# Patient Record
Sex: FEMALE | Race: WHITE | NOT HISPANIC OR LATINO | URBAN - METROPOLITAN AREA
[De-identification: names, ages, dates, MRNs, and addresses within clinical notes are randomized per-mention and may not be internally consistent; named-entity substitution may affect disease eponyms.]

---

## 2017-11-13 ENCOUNTER — OFFICE VISIT - HEALTHEAST (OUTPATIENT)
Dept: GERIATRICS | Facility: CLINIC | Age: 57
End: 2017-11-13

## 2017-11-13 ENCOUNTER — AMBULATORY - HEALTHEAST (OUTPATIENT)
Dept: ADMINISTRATIVE | Facility: CLINIC | Age: 57
End: 2017-11-13

## 2017-11-13 DIAGNOSIS — K21.9 GERD (GASTROESOPHAGEAL REFLUX DISEASE): ICD-10-CM

## 2017-11-13 DIAGNOSIS — F42.9 OCD (OBSESSIVE COMPULSIVE DISORDER): ICD-10-CM

## 2017-11-13 DIAGNOSIS — N17.9 AKI (ACUTE KIDNEY INJURY) (H): ICD-10-CM

## 2017-11-13 DIAGNOSIS — Z86.711 HISTORY OF PULMONARY EMBOLISM: ICD-10-CM

## 2017-11-13 DIAGNOSIS — S82.891A ANKLE FRACTURE, RIGHT: ICD-10-CM

## 2017-11-13 DIAGNOSIS — I10 HTN (HYPERTENSION): ICD-10-CM

## 2017-11-13 DIAGNOSIS — M48.061 LUMBAR STENOSIS: ICD-10-CM

## 2017-11-13 DIAGNOSIS — F41.9 ANXIETY: ICD-10-CM

## 2017-11-13 DIAGNOSIS — F32.A DEPRESSION: ICD-10-CM

## 2017-11-13 DIAGNOSIS — D72.829 LEUKOCYTOSIS: ICD-10-CM

## 2017-11-13 DIAGNOSIS — E11.9 TYPE 2 DIABETES MELLITUS (H): ICD-10-CM

## 2017-11-13 RX ORDER — OMEPRAZOLE 20 MG/1
20 TABLET, DELAYED RELEASE ORAL
Status: SHIPPED | COMMUNITY
Start: 2017-11-13 | End: 2023-01-01

## 2017-11-13 RX ORDER — CLONAZEPAM 0.5 MG/1
0.5 TABLET ORAL 2 TIMES DAILY PRN
Status: SHIPPED | COMMUNITY
Start: 2017-11-13 | End: 2023-01-01

## 2017-11-13 RX ORDER — POLYETHYLENE GLYCOL 3350 17 G/17G
17 POWDER, FOR SOLUTION ORAL DAILY PRN
Status: SHIPPED | COMMUNITY
Start: 2017-11-13

## 2017-11-13 RX ORDER — ATORVASTATIN CALCIUM 40 MG/1
40 TABLET, FILM COATED ORAL EVERY EVENING
Status: SHIPPED | COMMUNITY
Start: 2017-11-13

## 2017-11-13 RX ORDER — LISINOPRIL AND HYDROCHLOROTHIAZIDE 20; 25 MG/1; MG/1
1 TABLET ORAL DAILY
Status: SHIPPED | COMMUNITY
Start: 2017-11-13 | End: 2023-01-01

## 2017-11-13 RX ORDER — PREGABALIN 50 MG/1
50 CAPSULE ORAL 3 TIMES DAILY
Status: SHIPPED | COMMUNITY
Start: 2017-11-13 | End: 2023-01-01

## 2017-11-13 RX ORDER — OXYCODONE HYDROCHLORIDE 5 MG/1
5 TABLET ORAL EVERY 4 HOURS PRN
Status: SHIPPED | COMMUNITY
Start: 2017-11-13 | End: 2023-01-01

## 2017-11-13 RX ORDER — DULOXETIN HYDROCHLORIDE 30 MG/1
90 CAPSULE, DELAYED RELEASE ORAL DAILY
Status: SHIPPED | COMMUNITY
Start: 2017-11-13

## 2017-11-13 RX ORDER — ACETAMINOPHEN 500 MG
500 TABLET ORAL 3 TIMES DAILY
Status: SHIPPED | COMMUNITY
Start: 2017-11-13 | End: 2023-01-01

## 2017-11-13 RX ORDER — WARFARIN SODIUM 5 MG/1
5 TABLET ORAL SEE ADMIN INSTRUCTIONS
Status: SHIPPED | COMMUNITY
Start: 2017-11-13

## 2017-11-27 ENCOUNTER — OFFICE VISIT - HEALTHEAST (OUTPATIENT)
Dept: GERIATRICS | Facility: CLINIC | Age: 57
End: 2017-11-27

## 2017-11-27 ENCOUNTER — RECORDS - HEALTHEAST (OUTPATIENT)
Dept: ADMINISTRATIVE | Facility: OTHER | Age: 57
End: 2017-11-27

## 2017-11-27 DIAGNOSIS — E11.9 DM TYPE 2 (DIABETES MELLITUS, TYPE 2) (H): ICD-10-CM

## 2017-11-27 DIAGNOSIS — E66.9 OBESITY: ICD-10-CM

## 2017-11-29 ENCOUNTER — RECORDS - HEALTHEAST (OUTPATIENT)
Dept: ADMINISTRATIVE | Facility: OTHER | Age: 57
End: 2017-11-29

## 2017-11-30 ENCOUNTER — OFFICE VISIT - HEALTHEAST (OUTPATIENT)
Dept: GERIATRICS | Facility: CLINIC | Age: 57
End: 2017-11-30

## 2017-11-30 DIAGNOSIS — E11.9 DM TYPE 2 (DIABETES MELLITUS, TYPE 2) (H): ICD-10-CM

## 2017-12-04 ENCOUNTER — OFFICE VISIT - HEALTHEAST (OUTPATIENT)
Dept: GERIATRICS | Facility: CLINIC | Age: 57
End: 2017-12-04

## 2017-12-04 DIAGNOSIS — E11.9 DM TYPE 2 (DIABETES MELLITUS, TYPE 2) (H): ICD-10-CM

## 2017-12-06 RX ORDER — INSULIN GLARGINE 100 [IU]/ML
12 INJECTION, SOLUTION SUBCUTANEOUS AT BEDTIME
Status: SHIPPED | COMMUNITY
Start: 2017-12-06 | End: 2023-01-01 | Stop reason: ALTCHOICE

## 2017-12-07 ENCOUNTER — OFFICE VISIT - HEALTHEAST (OUTPATIENT)
Dept: GERIATRICS | Facility: CLINIC | Age: 57
End: 2017-12-07

## 2017-12-07 DIAGNOSIS — E66.9 OBESITY: ICD-10-CM

## 2017-12-07 DIAGNOSIS — E11.9 DM TYPE 2 (DIABETES MELLITUS, TYPE 2) (H): ICD-10-CM

## 2017-12-11 ENCOUNTER — OFFICE VISIT - HEALTHEAST (OUTPATIENT)
Dept: GERIATRICS | Facility: CLINIC | Age: 57
End: 2017-12-11

## 2017-12-11 DIAGNOSIS — E11.9 DM TYPE 2 (DIABETES MELLITUS, TYPE 2) (H): ICD-10-CM

## 2017-12-13 ENCOUNTER — OFFICE VISIT - HEALTHEAST (OUTPATIENT)
Dept: GERIATRICS | Facility: CLINIC | Age: 57
End: 2017-12-13

## 2017-12-13 DIAGNOSIS — R30.0 DYSURIA: ICD-10-CM

## 2017-12-14 ENCOUNTER — OFFICE VISIT - HEALTHEAST (OUTPATIENT)
Dept: GERIATRICS | Facility: CLINIC | Age: 57
End: 2017-12-14

## 2017-12-14 DIAGNOSIS — R30.0 DYSURIA: ICD-10-CM

## 2017-12-18 ENCOUNTER — OFFICE VISIT - HEALTHEAST (OUTPATIENT)
Dept: GERIATRICS | Facility: CLINIC | Age: 57
End: 2017-12-18

## 2017-12-18 DIAGNOSIS — F41.9 ANXIETY: ICD-10-CM

## 2017-12-22 ENCOUNTER — AMBULATORY - HEALTHEAST (OUTPATIENT)
Dept: GERIATRICS | Facility: CLINIC | Age: 57
End: 2017-12-22

## 2018-01-08 ENCOUNTER — RECORDS - HEALTHEAST (OUTPATIENT)
Dept: ADMINISTRATIVE | Facility: OTHER | Age: 58
End: 2018-01-08

## 2018-01-24 ENCOUNTER — RECORDS - HEALTHEAST (OUTPATIENT)
Dept: ADMINISTRATIVE | Facility: OTHER | Age: 58
End: 2018-01-24

## 2018-03-05 ENCOUNTER — RECORDS - HEALTHEAST (OUTPATIENT)
Dept: ADMINISTRATIVE | Facility: OTHER | Age: 58
End: 2018-03-05

## 2021-05-31 VITALS — WEIGHT: 293 LBS

## 2021-05-31 VITALS — WEIGHT: 293 LBS | WEIGHT: 293 LBS

## 2021-06-14 NOTE — PROGRESS NOTES
[facility]    Facility:   [facility] [[id number]]   Code Status: POLST AVAILABLE      CHIEF COMPLAINT/REASON FOR VISIT:  Chief Complaint   Patient presents with     Problem Visit     Hyperglycemia       HISTORY:      HPI: Lyndsay Rene is a 57 y.o. female with a past medical history of morbid obesity, chronic back pain due to lumbar stenosis, history of pulmonary embolism, depression, anxiety, OCD, type 2 DM, hypertension, and GERD who was admitted from 11/3/17 to 11/8/17 after a right ankle fracture secondary to a mechanical fall with hospital course complicated by AMS, JR, and leukocytosis.    Last week the the nursing staff asked for an evaluation of patient's medication regimen related to DMII. The patient has had blood glucose checks ranging from the upper 200's to 437. Each check has been well outside of expected value. She reports that she has been drinking a great deal of juice. She did not understand that it was not sugar free juice and has been having around 4 to 5 large 24 ounce glasses full per day. Additionally, she states she has not been eating a diabetic diet. She is snacking often. She denies any complications or concerns. She denies any other concerns including headaches, vision changes, chest pain/pressure, difficulty breathing, SOB, abdominal pain, nausea, vomiting, diarrhea, dysuria, increasing weakness.     Today she reports that she has quit snacking and has quit drinking juices but she continues to have elevated blood sugars. She states that she is feeling better and that she feels her blood sugars must be improving. She continues to denies any other concerns. When specifically asked about the side effects of Metformin, she reports some mild nausea, that she will work through.     Past Medical History:   Diagnosis Date     Abdominal pain 06/24/2017     Abdominal wall cellulitis 07/23/2014     Adverse effect of drug with proper dosing 08/27/2013      Anxiety      Atypical chest pain 06/24/2010     Back pain 02/26/2015     Bradycardia 12/19/2016     Cellulitis      Chest pain 11/20/17     Depression      DM type 2 (diabetes mellitus, type 2)      Dyslipidemia 01/06/2009     HTN (hypertension)      Hypokalemia      Hypokalemia 07/23/2012     Hypotension due to drugs 06/09/2017     Incarcerated umbilical hernia 02/25/2012     Leucocytosis      Leucocytosis 01/06/2013     Lumbar radiculopathy 01/06/2009     Lumbar stenosis      Lumbar stenosis 08/16/2014     Obesity      OCD (obsessive compulsive disorder)      Pneumonia 06/24/2017     Primary osteoarthritis of right knee 11/16/2016     Secondary DM without complications, uncontrolled 11/20/2007     Sepsis 04/27/2010     SIRS (systemic inflammatory response syndrome) 01/06/2013     Small bowel obstruction 06/11/2017     Stasis dermatitis 08/27/2013     Suicidal ideation 07/09/2015     Syncope      Tachycardia 01/06/2013     Umbilical hernia      UTI (urinary tract infection)      Vitamin D deficiency 03/06/2015             Family History   Problem Relation Age of Onset     Diabetes Mother      Diabetes Father      Social History     Social History     Marital status:      Spouse name: N/A     Number of children: N/A     Years of education: N/A     Social History Main Topics     Smoking status: Never Smoker     Smokeless tobacco: Never Used     Alcohol use No     Drug use: No     Sexual activity: Not Currently     Other Topics Concern     Not on file     Social History Narrative     No narrative on file       REVIEW OF SYSTEM:  Constitutional: negative  Eyes: negative  Ears, nose, mouth, throat, and face: negative  Respiratory: negative  Cardiovascular: negative  Gastrointestinal: negative  Genitourinary:negative  Integument/breast: negative  Hematologic/lymphatic: negative  Musculoskeletal:negative  Neurological: negative  Behavioral/Psych: negative  Endocrine: positive for elevated blood sugars    PHYSICAL  EXAM:   /70  Pulse (!) 105  Temp 98.4  F (36.9  C)  Resp 16  SpO2 95%  General appearance: alert, appears stated age and cooperative  Lungs: clear to auscultation bilaterally  Heart: regular rate and rhythm, S1, S2 normal, no murmur, click, rub or gallop  Abdomen: soft, non-tender; bowel sounds normal; no masses,  no organomegaly  Extremities: extremities normal, atraumatic, no cyanosis or edema, recent fracture of right ankle, casted.  Pulses: 2+ and symmetric  Skin: Skin color, texture, turgor normal. No rashes or lesions      LABS:   Hemoglobin A1c in one week    No results found for: HGBA1C      ASSESSMENT:      ICD-10-CM    1. DM type 2 (diabetes mellitus, type 2) E11.9      PLAN:    Uncontrolled DMII  -Continue current care plan from last visit, however add Lantus 10 units subcutaneously daily.   -Follow up in one week.     Total 25 minutes of which 75% was spent counseling and coordination of care of the above plan with patient, staff, and nursing staff.    Electronically signed by: Marie Oliveira CNP

## 2021-06-14 NOTE — PROGRESS NOTES
[facility]    Facility:   [facility] [[id number]]   Code Status: POLST AVAILABLE      CHIEF COMPLAINT/REASON FOR VISIT:  Chief Complaint   Patient presents with     Problem Visit     Hyperglycemia       HISTORY:      HPI: Lyndsay Rene is a 57 y.o. female with a past medical history of morbid obesity, chronic back pain due to lumbar stenosis, history of pulmonary embolism, depression, anxiety, OCD, type 2 DM, hypertension, and GERD who was admitted from 11/3/17 to 11/8/17 after a right ankle fracture secondary to a mechanical fall with hospital course complicated by AMS, JR, and leukocytosis. In recent history the the nursing staff asked for an evaluation of patient's medication regimen related to DMII. The patient has had blood glucose checks ranging from the upper 200's to 437. Each check has been well outside of expected value. She reports that she has been drinking a great deal of juice. She did not understand that it was not sugar free juice and has been having around 4 to 5 large 24 ounce glasses full per day. Additionally, she states she has not been eating a diabetic diet. She is snacking often. She denies any complications or concerns. She denies any other concerns including headaches, vision changes, chest pain/pressure, difficulty breathing, SOB, abdominal pain, nausea, vomiting, diarrhea, dysuria, increasing weakness.     In the past week she reports that she has quit snacking and has quit drinking juices but she continues to have elevated blood sugars. She states that she is feeling better and that she feels her blood sugars must be improving. When specifically asked about the side effects of Metformin, she reports some mild nausea, that she will work through.     Today nursing staff and patient relate that patient has continues to have significant hyperglycemia. Blood sugars have been consistently elevated, ranging in the 300's to the 400's. Patient has no other  problems she would like to discuss. She denies any other concerns including headaches, vision changes, chest pain/pressure, difficulty breathing, SOB, abdominal pain, nausea, vomiting, diarrhea, dysuria, increasing weakness.       Past Medical History:   Diagnosis Date     Abdominal pain 06/24/2017     Abdominal wall cellulitis 07/23/2014     Adverse effect of drug with proper dosing 08/27/2013     Anxiety      Atypical chest pain 06/24/2010     Back pain 02/26/2015     Bradycardia 12/19/2016     Cellulitis      Chest pain 11/20/17     Depression      DM type 2 (diabetes mellitus, type 2)      Dyslipidemia 01/06/2009     HTN (hypertension)      Hypokalemia      Hypokalemia 07/23/2012     Hypotension due to drugs 06/09/2017     Incarcerated umbilical hernia 02/25/2012     Leucocytosis      Leucocytosis 01/06/2013     Lumbar radiculopathy 01/06/2009     Lumbar stenosis      Lumbar stenosis 08/16/2014     Obesity      OCD (obsessive compulsive disorder)      Pneumonia 06/24/2017     Primary osteoarthritis of right knee 11/16/2016     Secondary DM without complications, uncontrolled 11/20/2007     Sepsis 04/27/2010     SIRS (systemic inflammatory response syndrome) 01/06/2013     Small bowel obstruction 06/11/2017     Stasis dermatitis 08/27/2013     Suicidal ideation 07/09/2015     Syncope      Tachycardia 01/06/2013     Umbilical hernia      UTI (urinary tract infection)      Vitamin D deficiency 03/06/2015             Family History   Problem Relation Age of Onset     Diabetes Mother      Diabetes Father      Social History     Social History     Marital status:      Spouse name: N/A     Number of children: N/A     Years of education: N/A     Social History Main Topics     Smoking status: Never Smoker     Smokeless tobacco: Never Used     Alcohol use No     Drug use: No     Sexual activity: Not Currently     Other Topics Concern     Not on file     Social History Narrative     No narrative on file       REVIEW  OF SYSTEM:  Constitutional: negative  Eyes: negative  Ears, nose, mouth, throat, and face: negative  Respiratory: negative  Cardiovascular: negative  Gastrointestinal: negative  Genitourinary:negative  Integument/breast: negative  Hematologic/lymphatic: negative  Musculoskeletal:negative  Neurological: negative  Behavioral/Psych: negative  Endocrine: positive for elevated blood sugars    PHYSICAL EXAM:   /72  Pulse 68  Temp 97.8  F (36.6  C)  Resp 16  Wt (!) 377 lb 9.6 oz (171.3 kg)  SpO2 95%  General appearance: alert, appears stated age and cooperative  Lungs: clear to auscultation bilaterally  Heart: regular rate and rhythm, S1, S2 normal, no murmur, click, rub or gallop  Abdomen: soft, non-tender; bowel sounds normal; no masses,  no organomegaly  Extremities: extremities normal, atraumatic, no cyanosis or edema, recent fracture of right ankle, casted.  Pulses: 2+ and symmetric  Skin: Skin color, texture, turgor normal. No rashes or lesions      LABS:   Will continue to monitor blood glucose, HGBA1C pending.    No results found for: HGBA1C      ASSESSMENT:      ICD-10-CM    1. DM type 2 (diabetes mellitus, type 2) E11.9      PLAN:    Uncontrolled DMII  -Continue current care plan from last visit, including addition of Lantus, however increase Lantus by 2 units every three days until blood sugars are less than 180, or you reach 20 units.   -Consult endocrinology for difficult to manage DMII.  -Follow up at the end of this week--consider adding sliding scale insulin at next visit.   -Encouraged patient to continue following ADA diet.     Total 25 minutes of which 75% was spent counseling and coordination of care of the above plan with patient, staff, and nursing staff.    Electronically signed by: Marie Oliveira CNP

## 2021-06-14 NOTE — PROGRESS NOTES
[facility]    Facility:   [facility] [[id number]]   Code Status: POLST AVAILABLE      CHIEF COMPLAINT/REASON FOR VISIT:  Chief Complaint   Patient presents with     Problem Visit     Hyperglycemia       HISTORY:      HPI: Lyndsay Rene is a 57 y.o. female with a past medical history of morbid obesity, chronic back pain due to lumbar stenosis, history of pulmonary embolism, depression, anxiety, OCD, type 2 DM, hypertension, and GERD who was admitted from 11/3/17 to 11/8/17 after a right ankle fracture secondary to a mechanical fall with hospital course complicated by AMS, JR, and leukocytosis. In recent history the the nursing staff asked for an evaluation of patient's medication regimen related to DMII. The patient has had blood glucose checks ranging from the upper 200's to 380's. Each check has been well outside of expected value. She denies any complications or concerns. In the past weeks she reports that she has quit snacking and has quit drinking juices but she continues to have elevated blood sugars. She states that she is feeling better and that she feels her blood sugars must be improving. Metformin is no longer problematic.    Today nursing staff and patient relate that patient has continues to have significant hyperglycemia. However it has started to improve with the addition of Metformin and titration of insulin. Blood sugars have been consistently elevated, ranging in the 200's to 300's They are now improving and mostly in the 200 range. They do feel she is improving. Patient has no other problems she would like to discuss. Her mood has much improved and she is now participating in PT/OT and encouraged by her progress. She feels she is well and is able to go home. She denies any other concerns including headaches, vision changes, chest pain/pressure, difficulty breathing, SOB, abdominal pain, nausea, vomiting, diarrhea, dysuria, increasing weakness.       Past  Medical History:   Diagnosis Date     Abdominal pain 06/24/2017     Abdominal wall cellulitis 07/23/2014     Adverse effect of drug with proper dosing 08/27/2013     Anxiety      Atypical chest pain 06/24/2010     Back pain 02/26/2015     Bradycardia 12/19/2016     Cellulitis      Chest pain 11/20/17     Depression      DM type 2 (diabetes mellitus, type 2)      Dyslipidemia 01/06/2009     HTN (hypertension)      Hypokalemia      Hypokalemia 07/23/2012     Hypotension due to drugs 06/09/2017     Incarcerated umbilical hernia 02/25/2012     Leucocytosis      Leucocytosis 01/06/2013     Lumbar radiculopathy 01/06/2009     Lumbar stenosis      Lumbar stenosis 08/16/2014     Obesity      OCD (obsessive compulsive disorder)      Pneumonia 06/24/2017     Primary osteoarthritis of right knee 11/16/2016     Secondary DM without complications, uncontrolled 11/20/2007     Sepsis 04/27/2010     SIRS (systemic inflammatory response syndrome) 01/06/2013     Small bowel obstruction 06/11/2017     Stasis dermatitis 08/27/2013     Suicidal ideation 07/09/2015     Syncope      Tachycardia 01/06/2013     Umbilical hernia      UTI (urinary tract infection)      Vitamin D deficiency 03/06/2015             Family History   Problem Relation Age of Onset     Diabetes Mother      Diabetes Father      Social History     Social History     Marital status:      Spouse name: N/A     Number of children: N/A     Years of education: N/A     Social History Main Topics     Smoking status: Never Smoker     Smokeless tobacco: Never Used     Alcohol use No     Drug use: No     Sexual activity: Not Currently     Other Topics Concern     Not on file     Social History Narrative     No narrative on file       REVIEW OF SYSTEM:  Constitutional: negative  Eyes: negative  Ears, nose, mouth, throat, and face: negative  Respiratory: negative  Cardiovascular: negative  Gastrointestinal: negative  Genitourinary:negative  Integument/breast:  negative  Hematologic/lymphatic: negative  Musculoskeletal:negative  Neurological: negative  Behavioral/Psych: negative  Endocrine: positive for elevated blood sugars    PHYSICAL EXAM:   /77  Pulse 86  Temp 97.4  F (36.3  C)  Resp 19  Wt (!) 379 lb 8 oz (172.1 kg)  SpO2 98%  General appearance: alert, appears stated age and cooperative  Lungs: clear to auscultation bilaterally  Heart: regular rate and rhythm, S1, S2 normal, no murmur, click, rub or gallop  Abdomen: soft, non-tender; bowel sounds normal; no masses,  no organomegaly  Extremities: extremities normal, atraumatic, no cyanosis or edema, recent fracture of right ankle, casted.  Pulses: 2+ and symmetric  Skin: Skin color, texture, turgor normal. No rashes or lesions      LABS:   Will continue to monitor blood glucose, HGBA1C pending.    No results found for: HGBA1C      ASSESSMENT:      ICD-10-CM    1. DM type 2 (diabetes mellitus, type 2) E11.9      PLAN:    Uncontrolled DMII  -Continue current care plan from last visit, including addition of Lantus. Lantus was still not being given. New order written to start Lantus.   -Consult endocrinology for difficult to manage DMII.  -Encouraged patient to continue following ADA diet.     Continue current services.     Total 25 minutes of which 75% was spent counseling and coordination of care of the above plan with patient, staff, and nursing staff.    Electronically signed by: Marie Oliveira CNP

## 2021-06-14 NOTE — PROGRESS NOTES
[facility]    Facility:   [facility] [[id number]]   Code Status: POLST AVAILABLE      CHIEF COMPLAINT/REASON FOR VISIT:  Chief Complaint   Patient presents with     Problem Visit     Hyperglycemia       HISTORY:      HPI: Lyndsay Rene is a 57 y.o. female with a past medical history of morbid obesity, chronic back pain due to lumbar stenosis, history of pulmonary embolism, depression, anxiety, OCD, type 2 DM, hypertension, and GERD who was admitted from 11/3/17 to 11/8/17 after a right ankle fracture secondary to a mechanical fall with hospital course complicated by AMS, JR, and leukocytosis. In recent history the the nursing staff asked for an evaluation of patient's medication regimen related to DMII. The patient has had blood glucose checks ranging from the upper 200's to 380's. Each check has been well outside of expected value. She denies any complications or concerns. In the past weeks she reports that she has quit snacking and has quit drinking juices but she continues to have elevated blood sugars. She states that she is feeling better and that she feels her blood sugars must be improving. Metformin is no longer problematic.    Today nursing staff and patient relate that patient has continues to have significant hyperglycemia. However it has started to improve with the addition of Metformin and titration of insulin. Blood sugars have been consistently elevated, ranging in the 200's to 380's. Patient has no other problems she would like to discuss. She does state that she would like to go home. She is getting quite agitated when discussing the ability to go home. She no longer wants to participate in PT/OT. She feels she is well and is able to go home. She denies any other concerns including headaches, vision changes, chest pain/pressure, difficulty breathing, SOB, abdominal pain, nausea, vomiting, diarrhea, dysuria, increasing weakness.       Past Medical History:    Diagnosis Date     Abdominal pain 06/24/2017     Abdominal wall cellulitis 07/23/2014     Adverse effect of drug with proper dosing 08/27/2013     Anxiety      Atypical chest pain 06/24/2010     Back pain 02/26/2015     Bradycardia 12/19/2016     Cellulitis      Chest pain 11/20/17     Depression      DM type 2 (diabetes mellitus, type 2)      Dyslipidemia 01/06/2009     HTN (hypertension)      Hypokalemia      Hypokalemia 07/23/2012     Hypotension due to drugs 06/09/2017     Incarcerated umbilical hernia 02/25/2012     Leucocytosis      Leucocytosis 01/06/2013     Lumbar radiculopathy 01/06/2009     Lumbar stenosis      Lumbar stenosis 08/16/2014     Obesity      OCD (obsessive compulsive disorder)      Pneumonia 06/24/2017     Primary osteoarthritis of right knee 11/16/2016     Secondary DM without complications, uncontrolled 11/20/2007     Sepsis 04/27/2010     SIRS (systemic inflammatory response syndrome) 01/06/2013     Small bowel obstruction 06/11/2017     Stasis dermatitis 08/27/2013     Suicidal ideation 07/09/2015     Syncope      Tachycardia 01/06/2013     Umbilical hernia      UTI (urinary tract infection)      Vitamin D deficiency 03/06/2015             Family History   Problem Relation Age of Onset     Diabetes Mother      Diabetes Father      Social History     Social History     Marital status:      Spouse name: N/A     Number of children: N/A     Years of education: N/A     Social History Main Topics     Smoking status: Never Smoker     Smokeless tobacco: Never Used     Alcohol use No     Drug use: No     Sexual activity: Not Currently     Other Topics Concern     Not on file     Social History Narrative     No narrative on file       REVIEW OF SYSTEM:  Constitutional: negative  Eyes: negative  Ears, nose, mouth, throat, and face: negative  Respiratory: negative  Cardiovascular: negative  Gastrointestinal: negative  Genitourinary:negative  Integument/breast:  "negative  Hematologic/lymphatic: negative  Musculoskeletal:negative  Neurological: negative  Behavioral/Psych: negative  Endocrine: positive for elevated blood sugars    PHYSICAL EXAM:   /77  Pulse 86  Temp 97.4  F (36.3  C)  Resp 18  Wt (!) 379 lb 8 oz (172.1 kg)  SpO2 98%  General appearance: alert, appears stated age and cooperative  Lungs: clear to auscultation bilaterally  Heart: regular rate and rhythm, S1, S2 normal, no murmur, click, rub or gallop  Abdomen: soft, non-tender; bowel sounds normal; no masses,  no organomegaly  Extremities: extremities normal, atraumatic, no cyanosis or edema, recent fracture of right ankle, casted.  Pulses: 2+ and symmetric  Skin: Skin color, texture, turgor normal. No rashes or lesions      LABS:   Will continue to monitor blood glucose, HGBA1C pending.    No results found for: HGBA1C      ASSESSMENT:      ICD-10-CM    1. Obesity E66.9    2. DM type 2 (diabetes mellitus, type 2) E11.9      PLAN:    Uncontrolled DMII  -Continue current care plan from last visit, including addition of Lantus. It was found that somebody discontinued Lantus inadvertently and without an order.   -Increased Lispro 75/25 to 70 units subcutaneously twice daily before meals. Patient was previously on 80 units twice daily and reports her blood sugars were \"never out of range\".  -Consult endocrinology for difficult to manage DMII.  -Follow up at the end of next week--consider adding sliding scale insulin at next visit.   -Encouraged patient to continue following ADA diet.     Plans for Discharge to Home  -Patient is not yet safe to go home, she indeed failed her home study.   -Continue PT/OT.   -Consult with social work to ensure needs are met at home with equipment, adaptive tools, and home services.    Total 25 minutes of which 75% was spent counseling and coordination of care of the above plan with patient, staff, and nursing staff.    Electronically signed by: Marie Oliveira CNP  "

## 2021-06-14 NOTE — PROGRESS NOTES
"Code Status:  FULL CODE  Visit Type: H & P     Facility:  [facility] [[id number]]      Facility Type: SNF (Skilled Nursing Facility, TCU)    History of Present Illness:   Hospital Admission Date: 11/3/17 Hospital Discharge Date: 11/8/17 at [facility]   Facility Admission Date: 11/8/17    Lyndsay Rene is a 57 y.o. female with a past medical history of morbid obesity, chronic back pain due to lumbar stenosis, history of pulmonary embolism, depression, anxiety, OCD, type 2 DM, hypertension, and GERD who was admitted from 11/3/17 to 11/8/17 after a right ankle fracture secondary to a mechanical fall with hospital course complicated by AMS, JR, and leukocytosis.    Prior to admission to the hospital, she was outside getting groceries when she slipped on snow on the grass and fell and heard a \"crack\". She was brought to [place] ED and was noted to have a transverse fracture of the lateral malleolus and she was admitted for pain control and strengthening. Additionally, she had been seen in the ED 18 times and admitted 3 times since June 2017 for multiple complaints including abdominal pain, weakness, and falls. During her hospital course, she developed a brief episode of altered mental status in which a rapid response was called on 11/6/17 that was thought to be secondary to her fentanyl patch so she was recommended to have scheduled tylenol, small amounts of oxycodone for severe breakthrough pain, and alternative non-narcotic modalities. Additionally, during the rapid response, she was noted to have a slightly elevated temperature and was given IVF and one dose of empiric antibiotic for possible sepsis. This was stopped after there was negative infection work up but she was noted to have a mild leukocytosis on 11/5/17. She also had a JR that resolved with fluid hydration. Her remaining home medications were continued without change except she was discontinued on hydroxyzine for its sedating effect. She " "was discharged to TCU for further strengthening and plans to follow up with Ortho in one week for repeat ankle x-ray.    Today, patient's main concern is her anxiety. Her mother and father passed away about 2 and 7 years ago respectively and she has been having a hard time with the loss because they were very close. She has been on cymbalta and clonazepam prn which she does not feel has been helping anymore because she often is crying and worried. She states that \"I want my mom and dad to help provide me comfort\". She denies any active suicidal ideation or attempts. This has made it hard for her to sleep. She says that the worry of being in a TCU and wondering how her ankle will heal is making her anxious so so has trouble falling asleep. She has been in therapy in the past but does not remember why she stopped but would be open again to it in the future. She lives with her  who has been providing her support.     In terms of her ankle pain, she has been having intermittent pain but it is tolerable with her pain medications. Prior to the injury, she was only using a cane and lives in a ground level apartment with her  with no steps. She does not have a home RN or PCA but her  is planning on having that set up once she discharges from the TCU. She does not drive and has no issues with bowel or bladder incontinence.    Additionally, patient and her  note that her warfarin was stopped by her PCP, Dr. Jonnie Arango, at [facility], about 2.5 weeks ago. She had an uncomplicated umbilical hernia repair on 6/20/17. However, she was later found to have a small emboli in the right lower lung lobe on 7/12/17 and was started on warfarin. Since the PE was thought to the provoked related to the surgery, patient and her  report she is supposed to be only on 3 months of anticoagulation therapy.         Past Medical History:   Diagnosis Date     Abdominal pain 06/24/2017     Abdominal wall " cellulitis 07/23/2014     Adverse effect of drug with proper dosing 08/27/2013     Anxiety      Atypical chest pain 06/24/2010     Back pain 02/26/2015     Bradycardia 12/19/2016     Cellulitis      Chest pain 11/20/17     Depression      DM type 2 (diabetes mellitus, type 2)      Dyslipidemia 01/06/2009     HTN (hypertension)      Hypokalemia      Hypokalemia 07/23/2012     Hypotension due to drugs 06/09/2017     Incarcerated umbilical hernia 02/25/2012     Leucocytosis      Leucocytosis 01/06/2013     Lumbar radiculopathy 01/06/2009     Lumbar stenosis      Lumbar stenosis 08/16/2014     Obesity      OCD (obsessive compulsive disorder)      Pneumonia 06/24/2017     Primary osteoarthritis of right knee 11/16/2016     Secondary DM without complications, uncontrolled 11/20/2007     Sepsis 04/27/2010     SIRS (systemic inflammatory response syndrome) 01/06/2013     Small bowel obstruction 06/11/2017     Stasis dermatitis 08/27/2013     Suicidal ideation 07/09/2015     Syncope      Tachycardia 01/06/2013     Umbilical hernia      UTI (urinary tract infection)      Vitamin D deficiency 03/06/2015     Past Surgical History:   Procedure Laterality Date     APPENDECTOMY       HERNIA REPAIR  06/20/2017     HYSTERECTOMY       WA REMOVE GALLBLADDER EXPLOR COMMON DUCT       Family History   Problem Relation Age of Onset     Diabetes Mother      Diabetes Father      Social History     Social History     Marital status:      Spouse name: N/A     Number of children: N/A     Years of education: N/A     Occupational History     Not on file.     Social History Main Topics     Smoking status: Never Smoker     Smokeless tobacco: Never Used     Alcohol use No     Drug use: No     Sexual activity: Not Currently     Other Topics Concern     Not on file     Social History Narrative     No narrative on file     Additional Geriatric Review: Lives at home with  in ground level apartment. Usually uses a cane to walk. Does not  drive. No bowel or bladder issues. No hearing or vision concerns.    Current Outpatient Prescriptions   Medication Sig Dispense Refill     acetaminophen (TYLENOL) 500 MG tablet Take 500 mg by mouth 3 (three) times a day.       atorvastatin (LIPITOR) 40 MG tablet Take 40 mg by mouth every evening.       clonazePAM (KLONOPIN) 0.5 MG tablet Take 0.5 mg by mouth 2 (two) times a day as needed for anxiety.       DULoxetine (CYMBALTA) 30 MG capsule Take 60 mg by mouth daily.       insulin lispro protamine-insulin lispro (HUMALOG 75-25) 100 unit/mL (75-25) Susp Inject 65 Units under the skin 2 (two) times a day before meals.       lisinopril-hydrochlorothiazide (PRINZIDE,ZESTORETIC) 20-25 mg per tablet Take 1 tablet by mouth daily.       omeprazole (PRILOSEC OTC) 20 MG tablet Take 20 mg by mouth daily before breakfast.       oxyCODONE (ROXICODONE) 5 MG immediate release tablet Take 5 mg by mouth every 4 (four) hours as needed for pain.       polyethylene glycol (MIRALAX) 17 gram packet Take 17 g by mouth daily.       pregabalin (LYRICA) 50 MG capsule Take 50 mg by mouth 3 (three) times a day. 1 tab po am;1 tab po afternoon;2 tabs at hs.       warfarin (COUMADIN) 5 MG tablet Take 5 mg by mouth See Admin Instructions. Adjust dose based on INR results as directed. Next INR 1 day after discharge       No current facility-administered medications for this visit.      No Known Allergies    There is no immunization history on file for this patient.      Review of Systems   Constitutional: Negative for activity change, appetite change, fatigue and fever.   HENT: Negative for congestion, ear discharge, ear pain, hearing loss and sore throat.    Eyes: Negative for pain, redness and visual disturbance.   Respiratory: Negative for cough, chest tightness and shortness of breath.    Gastrointestinal: Negative for abdominal pain, constipation, diarrhea and nausea.   Endocrine: Negative for polydipsia, polyphagia and polyuria.    Genitourinary: Negative for flank pain.   Musculoskeletal:        Right ankle pain with movement. No paresthesias.    Skin: Negative for rash and wound.   Neurological: Negative for dizziness, tremors, syncope, weakness, light-headedness and headaches.   Psychiatric/Behavioral: Positive for sleep disturbance. Negative for hallucinations and suicidal ideas. The patient is nervous/anxious.         Anxious mood. Trouble sleeping. No SI or HI.        Physical Exam   Constitutional: She is oriented to person, place, and time. She appears well-developed and well-nourished.   Tearful middle aged female with obesity. Seated in wheelchair with her . In emotional distress but no physical distress.   HENT:   Head: Normocephalic and atraumatic.   Eyes: Conjunctivae are normal. Pupils are equal, round, and reactive to light.   Neck: Neck supple.   Cardiovascular: Normal rate, regular rhythm and normal heart sounds.    Pulmonary/Chest: Effort normal and breath sounds normal. No respiratory distress.   Abdominal: Soft. Bowel sounds are normal. There is no tenderness. There is no rebound and no guarding.   Large pannus   Musculoskeletal:   Right ankle wrapped in ace wrap. Able to wiggle toes with normal capillary refill. Notes tenderness with palpation of lateral malleolus. Left ankle without deformity.   Neurological: She is alert and oriented to person, place, and time. No cranial nerve deficit.   Sensation and strength intact throughout.    Skin: Skin is warm and dry. No rash noted. No erythema.   Psychiatric:   Anxious mood, anxious affect. Fair insight, judgement, and memory.     Labs:  All labs reviewed in the nursing home record.    Assessment/Plan:  1. Ankle fracture, right  Right transverse fracture of lateral malleolus sustained on 11/3/17 after mechanical fall currently stable.Will continue scheduled tylenol  and prn oxycodone 5 mg Q4H prn for only severe pain. She will also continue therapies and will be non  weight bearing on right leg until re-evaluated by Ortho. Plan for follow up with [facility] and repeat ankle x-ray tomorrow, 11/14/17, to decide if cast needed.    2. Anxiety  Noted to have significant anxiety and difficulty sleeping. Will continue her on cymbalta 60 mg daily. Given that she is still having significant anxiety and sleep issues, will scheduled her 0.5 mg clonazepam BID and additional 0.5 mg clonazepam BID prn for breakthrough anxiety. Will also start mirtazapine 7.5 mg QHS for sleep and anxiety.   3. Depression  Contracts for safety and will continue management as above for her anxiety and depression.   4. OCD (obsessive compulsive disorder)  Will continue to provide reassurance to patient.   5. Type 2 diabetes mellitus  Blood sugars have been controlled. Will continue her Humalog 65 units BID before meals, POCT glucose checks 4x/day, and statin.    6. HTN (hypertension)  Normotensive. Will continue her lisinopril-HCTZ 20-25 mg daily.    7. GERD (gastroesophageal reflux disease)  Stable. Will continue omeprazole 20 mg daily.   8. Lumbar stenosis  Followed by [facility]. Will continue her Lyrica 50 mg QAM and 100 mg QHS. Will try to limit narcotics due to AMS she had while inpatient.   9. Leukocytosis  Noted to be 15 on 11/5/17 while at [facility]. No infectious symptoms and afebrile currently, but will recheck CBC to make sure resolved.    10. JR (acute kidney injury)  Has elevated creatinine that improved with IVF. Creatinine was 0.82 on 11/7/17. Will check BMP to make sure creatinine remains stable.    11. History of pulmonary embolism  History of small right lower lung lobe emboli suspected to be provoked secondary to surgery and seen on CT on 7/12/17. She has completed 3 months of anticoagulation with warfarin and asymptomatic. Will discontinue her warfarin as she has had adequate treatment course.        Jeniffer Strong MD PGY-3  [facility]     Patient  discussed with Dr. Pankaj Ramriez, attending physician, who agrees with the plan.  Patient was discussed examined and chart was reviewed with third-year family practice resident Dr. Carri Graham and I agree with her assessment and plan. Pankaj Ramirez MD        Total 45 minutes of which 65% was spent in counseling and coordination of care of the above plan.    Electronically signed by: Pankaj Ramirez MD

## 2021-06-14 NOTE — PROGRESS NOTES
"[facility]    Facility:   [facility] [[id number]]   Code Status: POLST AVAILABLE      CHIEF COMPLAINT/REASON FOR VISIT:  Chief Complaint   Patient presents with     Problem Visit     DMII with uncontrolled blood sugars       HISTORY:      HPI: Lyndsay Rene is a 57 y.o. female with a past medical history of morbid obesity, chronic back pain due to lumbar stenosis, history of pulmonary embolism, depression, anxiety, OCD, type 2 DM, hypertension, and GERD who was admitted from 11/3/17 to 11/8/17 after a right ankle fracture secondary to a mechanical fall with hospital course complicated by AMS, JR, and leukocytosis.    Prior to admission to the hospital, she was outside getting groceries when she slipped on snow on the grass and fell and heard a \"crack\". She was brought to [facility] and was noted to have a transverse fracture of the lateral malleolus and she was admitted for pain control and strengthening. Additionally, she had been seen in the ED 18 times and admitted 3 times since June 2017 for multiple complaints including abdominal pain, weakness, and falls. During her hospital course, she developed a brief episode of altered mental status in which a rapid response was called on 11/6/17 that was thought to be secondary to her fentanyl patch so she was recommended to have scheduled tylenol, small amounts of oxycodone for severe breakthrough pain, and alternative non-narcotic modalities. Additionally, during the rapid response, she was noted to have a slightly elevated temperature and was given IVF and one dose of empiric antibiotic for possible sepsis. This was stopped after there was negative infection work up but she was noted to have a mild leukocytosis on 11/5/17. She also had a JR that resolved with fluid hydration. Her remaining home medications were continued without change except she was discontinued on hydroxyzine for its sedating effect. She was discharged to TCU " "for further strengthening and plans to follow up with Ortho in one week for repeat ankle x-ray.    Upon admission the patient's main concern was her anxiety. Her mother and father passed away about 2 and 7 years ago respectively and she has been having a hard time with the loss because they were very close. She has been on cymbalta and clonazepam prn which she does not feel has been helping anymore because she often is crying and worried. She states that \"I want my mom and dad to help provide me comfort\". She denies any active suicidal ideation or attempts. This has made it hard for her to sleep. She says that the worry of being in a TCU and wondering how her ankle will heal is making her anxious so so has trouble falling asleep. She has been in therapy in the past but does not remember why she stopped but would be open again to it in the future. She lives with her  who has been providing her support.     In terms of her ankle pain, she has been having intermittent pain but it is tolerable with her pain medications. Prior to the injury, she was only using a cane and lives in a ground level apartment with her  with no steps. She does not have a home RN or PCA but her  is planning on having that set up once she discharges from the TCU. She does not drive and has no issues with bowel or bladder incontinence.    Additionally, patient and her  note that her warfarin was stopped by her PCP, Dr. Jonnie Arango, at \[facility]", about 2.5 weeks ago. She had an uncomplicated umbilical hernia repair on 6/20/17. However, she was later found to have a small emboli in the right lower lung lobe on 7/12/17 and was started on warfarin. Since the PE was thought to the provoked related to the surgery, patient and her  report she is supposed to be only on 3 months of anticoagulation therapy.     Today the the nursing staff asked for an evaluation of patient's medication regimen related to DMII. The " patient has had blood glucose checks ranging from the upper 200's to 437. Each check has been well outside of expected value. She reports that she has been drinking a great deal of juice. She did not understand that it was not sugar free juice and has been having around 4 to 5 large 24 ounce glasses full per day. Additionally, she states she has not been eating a diabetic diet. She is snacking often. She denies any complications or concerns. She denies any other concerns including headaches, vision changes, chest pain/pressure, difficulty breathing, SOB, abdominal pain, nausea, vomiting, diarrhea, dysuria, increasing weakness.     Patient is not on Metformin and states she has never been on it. She states nobody has ever suggested adding that to her regimen. She would like to try it.      Past Medical History:   Diagnosis Date     Abdominal pain 06/24/2017     Abdominal wall cellulitis 07/23/2014     Adverse effect of drug with proper dosing 08/27/2013     Anxiety      Atypical chest pain 06/24/2010     Back pain 02/26/2015     Bradycardia 12/19/2016     Cellulitis      Chest pain 11/20/17     Depression      DM type 2 (diabetes mellitus, type 2)      Dyslipidemia 01/06/2009     HTN (hypertension)      Hypokalemia      Hypokalemia 07/23/2012     Hypotension due to drugs 06/09/2017     Incarcerated umbilical hernia 02/25/2012     Leucocytosis      Leucocytosis 01/06/2013     Lumbar radiculopathy 01/06/2009     Lumbar stenosis      Lumbar stenosis 08/16/2014     Obesity      OCD (obsessive compulsive disorder)      Pneumonia 06/24/2017     Primary osteoarthritis of right knee 11/16/2016     Secondary DM without complications, uncontrolled 11/20/2007     Sepsis 04/27/2010     SIRS (systemic inflammatory response syndrome) 01/06/2013     Small bowel obstruction 06/11/2017     Stasis dermatitis 08/27/2013     Suicidal ideation 07/09/2015     Syncope      Tachycardia 01/06/2013     Umbilical hernia      UTI (urinary tract  infection)      Vitamin D deficiency 03/06/2015             Family History   Problem Relation Age of Onset     Diabetes Mother      Diabetes Father      Social History     Social History     Marital status:      Spouse name: N/A     Number of children: N/A     Years of education: N/A     Social History Main Topics     Smoking status: Never Smoker     Smokeless tobacco: Never Used     Alcohol use No     Drug use: No     Sexual activity: Not Currently     Other Topics Concern     Not on file     Social History Narrative     No narrative on file       REVIEW OF SYSTEM:  Constitutional: negative  Eyes: negative  Ears, nose, mouth, throat, and face: negative  Respiratory: negative  Cardiovascular: negative  Gastrointestinal: negative  Genitourinary:negative  Integument/breast: negative  Hematologic/lymphatic: negative  Musculoskeletal:negative  Neurological: negative  Behavioral/Psych: negative  Endocrine: positive for elevated blood sugars    PHYSICAL EXAM:   /63  Pulse 65  Temp (!) 96.3  F (35.7  C)  Resp 18  Wt (!) 377 lb 9.6 oz (171.3 kg)  SpO2 96%  General appearance: alert, appears stated age and cooperative  Lungs: clear to auscultation bilaterally  Heart: regular rate and rhythm, S1, S2 normal, no murmur, click, rub or gallop  Abdomen: soft, non-tender; bowel sounds normal; no masses,  no organomegaly  Extremities: extremities normal, atraumatic, no cyanosis or edema, recent fracture of right ankle, casted.  Pulses: 2+ and symmetric  Skin: Skin color, texture, turgor normal. No rashes or lesions      LABS:   Hemoglobin A1c in one week    No results found for: HGBA1C      ASSESSMENT:      ICD-10-CM    1. Obesity E66.9    2. DM type 2 (diabetes mellitus, type 2) E11.9      PLAN:    Uncontrolled DMII  -Start Metformin, will titrate slowly to try and mitigate side effects. Started at 500 mg by mouth once daily for one week, then increased to 500 mg by mouth twice daily for one week and then finally  "1000 mg by mouth twice daily.  -Will check Hemoglobin A1c in one week to get a baseline. Pt is unaware of her last hemoglobin A1c but thinks it was relatively \"good\".   -No juice! Spent a great deal of time discussing sugars/carbs and cutting down on foods that would increase blood sugar.   -Dietician referral to discuss diabetic diet.   -Follow up on Thursday, will consider adding Lantus at that time.     Total 35 minutes of which 75% was spent counseling and coordination of care of the above plan with patient, staff, and nursing staff.    Electronically signed by: Marie Oliveira CNP  "

## 2021-06-14 NOTE — PROGRESS NOTES
[facility]    Facility:   [facility] [[id number]]   Code Status: POLST AVAILABLE      CHIEF COMPLAINT/REASON FOR VISIT:  Chief Complaint   Patient presents with     Review Of Multiple Medical Conditions     DMII, Hyperglycemia     Problem Visit     Dysuria       HISTORY:      HPI: Lyndsay Rene is a 57 y.o. female with a past medical history of morbid obesity, chronic back pain due to lumbar stenosis, history of pulmonary embolism, depression, anxiety, OCD, type 2 DM, hypertension, and GERD who was admitted from 11/3/17 to 11/8/17 after a right ankle fracture secondary to a mechanical fall with hospital course complicated by AMS, JR, and leukocytosis. In recent history the the nursing staff asked for an evaluation of patient's medication regimen related to DMII. The patient has had blood glucose checks ranging from the upper 200's. Each check has been well outside of expected value. She denies any complications or concerns. In the past weeks she reports that she has quit snacking and has quit drinking juices but she continues to have elevated blood sugars. She states that she is feeling better and that she feels her blood sugars must be improving. Metformin is no longer problematic.    Today nursing staff and patient relate that patient has continues to have significant hyperglycemia. However it has started to improve with the addition of Metformin and titration of insulin. They are now improving and mostly in the 200 range. They do feel she is improving. Patient has no other problems she would like to discuss. Her mood has much improved and she is now participating in PT/OT and encouraged by her progress. She does have some dysuria and has a history of UTIs. She would like to have her urine checked. She states she has urgency, frequency, and foul smelling urine. She feels she is well and is able to go home. She denies any other concerns including headaches, vision changes,  chest pain/pressure, difficulty breathing, SOB, abdominal pain, nausea, vomiting, diarrhea, increasing weakness.     Past Medical History:   Diagnosis Date     Abdominal pain 06/24/2017     Abdominal wall cellulitis 07/23/2014     Adverse effect of drug with proper dosing 08/27/2013     Anxiety      Atypical chest pain 06/24/2010     Back pain 02/26/2015     Bradycardia 12/19/2016     Cellulitis      Chest pain 11/20/17     Depression      DM type 2 (diabetes mellitus, type 2)      Dyslipidemia 01/06/2009     HTN (hypertension)      Hypokalemia      Hypokalemia 07/23/2012     Hypotension due to drugs 06/09/2017     Incarcerated umbilical hernia 02/25/2012     Leucocytosis      Leucocytosis 01/06/2013     Lumbar radiculopathy 01/06/2009     Lumbar stenosis      Lumbar stenosis 08/16/2014     Obesity      OCD (obsessive compulsive disorder)      Pneumonia 06/24/2017     Primary osteoarthritis of right knee 11/16/2016     Secondary DM without complications, uncontrolled 11/20/2007     Sepsis 04/27/2010     SIRS (systemic inflammatory response syndrome) 01/06/2013     Small bowel obstruction 06/11/2017     Stasis dermatitis 08/27/2013     Suicidal ideation 07/09/2015     Syncope      Tachycardia 01/06/2013     Umbilical hernia      UTI (urinary tract infection)      Vitamin D deficiency 03/06/2015             Family History   Problem Relation Age of Onset     Diabetes Mother      Diabetes Father      Social History     Social History     Marital status:      Spouse name: N/A     Number of children: N/A     Years of education: N/A     Social History Main Topics     Smoking status: Never Smoker     Smokeless tobacco: Never Used     Alcohol use No     Drug use: No     Sexual activity: Not Currently     Other Topics Concern     Not on file     Social History Narrative     No narrative on file       REVIEW OF SYSTEM:  Constitutional: negative  Eyes: negative  Ears, nose, mouth, throat, and face: negative  Respiratory:  negative  Cardiovascular: negative  Gastrointestinal: negative  Genitourinary: dysuria, frequency, urgency, foul smelling urine  Integument/breast: negative  Hematologic/lymphatic: negative  Musculoskeletal:negative  Neurological: negative  Behavioral/Psych: negative  Endocrine: positive for elevated blood sugars    PHYSICAL EXAM:   /77  Pulse 97  Temp 97.4  F (36.3  C)  Resp 20  Wt (!) 380 lb (172.4 kg)  General appearance: alert, appears stated age and cooperative  Lungs: clear to auscultation bilaterally  Heart: regular rate and rhythm, S1, S2 normal, no murmur, click, rub or gallop  Abdomen: soft, non-tender; bowel sounds normal; no masses,  no organomegaly  Extremities: extremities normal, atraumatic, no cyanosis or edema, recent fracture of right ankle, casted.  Pulses: 2+ and symmetric  Skin: Skin color, texture, turgor normal. No rashes or lesions      LABS:   Will continue to monitor blood glucose, HGBA1C pending.    No results found for: HGBA1C      ASSESSMENT:      ICD-10-CM    1. Dysuria R30.0      PLAN:    Uncontrolled DMII  -Blood sugars have been much better.   -Increase Lispro 75/25 to 80 units subcutaneously twice daily before meals.   -Continue Lantus.  -Consult endocrinology for difficult to manage DMII.  -Encouraged patient to continue following ADA diet.     Dysuria  -UA/UC order.   -Follow up with results.     Continue current services.     Total 25 minutes of which 75% was spent counseling and coordination of care of the above plan with patient, staff, and nursing staff.    Electronically signed by: Marie Oliveira CNP

## 2021-06-15 NOTE — PROGRESS NOTES
"[facility]    Facility:   [facility] [[id number]]   Code Status: POLST AVAILABLE      CHIEF COMPLAINT/REASON FOR VISIT:  Chief Complaint   Patient presents with     Problem Visit     Anxiety       HISTORY:      HPI: Lyndsay Rene is a 57 y.o. female with a past medical history of morbid obesity, chronic back pain due to lumbar stenosis, history of pulmonary embolism, depression, anxiety, OCD, type 2 DM, hypertension, and GERD who was admitted from 11/3/17 to 11/8/17 after a right ankle fracture secondary to a mechanical fall with hospital course complicated by AMS, JR, and leukocytosis.     Today nursing staff report that patient was sent to the hospital for anxiety on Sunday night. Apparently she had an episode of anxiety that the patient reports as \"not that bad\". She was just out of her medicine and nobody had phoned for any per her report. She said she had some hyperventilation and the nursing staff then thought she should be sent in. Since going to the ER and returning she states she has been 100 % fine. She has no medical complaints to discuss at this time. She denies any other concerns including headaches, vision changes, chest pain/pressure, difficulty breathing, SOB, abdominal pain, nausea, vomiting, diarrhea, increasing weakness.     Past Medical History:   Diagnosis Date     Abdominal pain 06/24/2017     Abdominal wall cellulitis 07/23/2014     Adverse effect of drug with proper dosing 08/27/2013     Anxiety      Atypical chest pain 06/24/2010     Back pain 02/26/2015     Bradycardia 12/19/2016     Cellulitis      Chest pain 11/20/17     Depression      DM type 2 (diabetes mellitus, type 2)      Dyslipidemia 01/06/2009     HTN (hypertension)      Hypokalemia      Hypokalemia 07/23/2012     Hypotension due to drugs 06/09/2017     Incarcerated umbilical hernia 02/25/2012     Leucocytosis      Leucocytosis 01/06/2013     Lumbar radiculopathy 01/06/2009     Lumbar " stenosis      Lumbar stenosis 08/16/2014     Obesity      OCD (obsessive compulsive disorder)      Pneumonia 06/24/2017     Primary osteoarthritis of right knee 11/16/2016     Secondary DM without complications, uncontrolled 11/20/2007     Sepsis 04/27/2010     SIRS (systemic inflammatory response syndrome) 01/06/2013     Small bowel obstruction 06/11/2017     Stasis dermatitis 08/27/2013     Suicidal ideation 07/09/2015     Syncope      Tachycardia 01/06/2013     Umbilical hernia      UTI (urinary tract infection)      Vitamin D deficiency 03/06/2015             Family History   Problem Relation Age of Onset     Diabetes Mother      Diabetes Father      Social History     Social History     Marital status:      Spouse name: N/A     Number of children: N/A     Years of education: N/A     Social History Main Topics     Smoking status: Never Smoker     Smokeless tobacco: Never Used     Alcohol use No     Drug use: No     Sexual activity: Not Currently     Other Topics Concern     Not on file     Social History Narrative     No narrative on file       REVIEW OF SYSTEM:  Constitutional: negative  Eyes: negative  Ears, nose, mouth, throat, and face: negative  Respiratory: negative  Cardiovascular: negative  Gastrointestinal: negative  Genitourinary: dysuria, frequency, urgency, foul smelling urine  Integument/breast: negative  Hematologic/lymphatic: negative  Musculoskeletal:negative  Neurological: negative  Behavioral/Psych: history of anxiety, none now.  Endocrine: positive for elevated blood sugars    PHYSICAL EXAM:   /77  Pulse 78  Temp 98.2  F (36.8  C)  Resp 18  Wt (!) 380 lb (172.4 kg)  SpO2 97%  General appearance: alert, appears stated age and cooperative  Lungs: clear to auscultation bilaterally  Heart: regular rate and rhythm, S1, S2 normal, no murmur, click, rub or gallop  Abdomen: soft, non-tender; bowel sounds normal; no masses,  no organomegaly  Extremities: extremities normal,  atraumatic, no cyanosis or edema, recent fracture of right ankle in a boot  Pulses: 2+ and symmetric  Skin: Skin color, texture, turgor normal. No rashes or lesions      LABS:   None today.    No results found for: HGBA1C      ASSESSMENT:      ICD-10-CM    1. Anxiety F41.9      PLAN:    Anxiety  -Thoroughly discussed anxiety, treatment, and plan of care with patient.   -Ensured patient had prescription medications: Clonazepam 0.5 mg by mouth twice daily. Orders left with nursing staff.   -Discussed discharge at length, which apparently put patient at ease.  -Discussed nonpharmalogic methods of stress and anxiety reduction such as deep breathing, essential oil use, exercise, and talk therapy.  -Follow up as needed.     Total 25 minutes of which 75% was spent counseling and coordination of care of the above plan with patient, staff, and nursing staff.    Electronically signed by: Marie Oliveira CNP

## 2021-06-15 NOTE — PROGRESS NOTES
"[facility]    Facility:   [facility] [[id number]]   Code Status: POLST AVAILABLE      CHIEF COMPLAINT/REASON FOR VISIT:  Chief Complaint   Patient presents with     Problem Visit     Dysuria       HISTORY:      HPI: Lyndsay Rene is a 57 y.o. female with a past medical history of morbid obesity, chronic back pain due to lumbar stenosis, history of pulmonary embolism, depression, anxiety, OCD, type 2 DM, hypertension, and GERD who was admitted from 11/3/17 to 11/8/17 after a right ankle fracture secondary to a mechanical fall with hospital course complicated by AMS, JR, and leukocytosis.     Today nursing staff reports they were unable to get a urine specimen in the past 24 hours. The patient is requesting/demanding to see me because the dysuria continues and \"nobody is taking care of her\". She states, \"I know I have a UTI!\". She gets them frequently. Orders were put in yesterday, however there was miscommunication and that lead to the UA not being collected. She denies any other concerns including headaches, vision changes, chest pain/pressure, difficulty breathing, SOB, abdominal pain, nausea, vomiting, diarrhea, increasing weakness.     Past Medical History:   Diagnosis Date     Abdominal pain 06/24/2017     Abdominal wall cellulitis 07/23/2014     Adverse effect of drug with proper dosing 08/27/2013     Anxiety      Atypical chest pain 06/24/2010     Back pain 02/26/2015     Bradycardia 12/19/2016     Cellulitis      Chest pain 11/20/17     Depression      DM type 2 (diabetes mellitus, type 2)      Dyslipidemia 01/06/2009     HTN (hypertension)      Hypokalemia      Hypokalemia 07/23/2012     Hypotension due to drugs 06/09/2017     Incarcerated umbilical hernia 02/25/2012     Leucocytosis      Leucocytosis 01/06/2013     Lumbar radiculopathy 01/06/2009     Lumbar stenosis      Lumbar stenosis 08/16/2014     Obesity      OCD (obsessive compulsive disorder)      Pneumonia " 06/24/2017     Primary osteoarthritis of right knee 11/16/2016     Secondary DM without complications, uncontrolled 11/20/2007     Sepsis 04/27/2010     SIRS (systemic inflammatory response syndrome) 01/06/2013     Small bowel obstruction 06/11/2017     Stasis dermatitis 08/27/2013     Suicidal ideation 07/09/2015     Syncope      Tachycardia 01/06/2013     Umbilical hernia      UTI (urinary tract infection)      Vitamin D deficiency 03/06/2015             Family History   Problem Relation Age of Onset     Diabetes Mother      Diabetes Father      Social History     Social History     Marital status:      Spouse name: N/A     Number of children: N/A     Years of education: N/A     Social History Main Topics     Smoking status: Never Smoker     Smokeless tobacco: Never Used     Alcohol use No     Drug use: No     Sexual activity: Not Currently     Other Topics Concern     Not on file     Social History Narrative     No narrative on file       REVIEW OF SYSTEM:  Constitutional: negative  Eyes: negative  Ears, nose, mouth, throat, and face: negative  Respiratory: negative  Cardiovascular: negative  Gastrointestinal: negative  Genitourinary: dysuria, frequency, urgency, foul smelling urine  Integument/breast: negative  Hematologic/lymphatic: negative  Musculoskeletal:negative  Neurological: negative  Behavioral/Psych: negative  Endocrine: positive for elevated blood sugars    PHYSICAL EXAM:   /77  Pulse 86  Temp 98.2  F (36.8  C)  Resp 18  Wt (!) 380 lb (172.4 kg)  SpO2 97%  General appearance: alert, appears stated age and cooperative  Lungs: clear to auscultation bilaterally  Heart: regular rate and rhythm, S1, S2 normal, no murmur, click, rub or gallop  Abdomen: soft, non-tender; bowel sounds normal; no masses,  no organomegaly  Extremities: extremities normal, atraumatic, no cyanosis or edema, recent fracture of right ankle, casted.  Pulses: 2+ and symmetric  Skin: Skin color, texture, turgor  normal. No rashes or lesions      LABS:   Lab Results   Component Value Date    COLORU Yellow 12/14/2017    CLARITYU Turbid (!) 12/14/2017    GLUCOSEU >1000 mg/dL (!) 12/14/2017    BILIRUBINUR Negative 12/14/2017    KETONESU Negative 12/14/2017    SPECGRAV 1.024 12/14/2017    HGBUA Trace (!) 12/14/2017    PHUR 6.5 12/14/2017    PROTEINUA 100 mg/dL (!) 12/14/2017    UROBILINOGEN <2.0 E.U./dL 12/14/2017    NITRITE Negative 12/14/2017    LEUKOCYTESUR Large (!) 12/14/2017    BACTERIA Many (!) 11/19/2017    RBCUA 0-2 11/19/2017    WBCUA  (!) 11/19/2017    SQUAMEPI 25-50 (!) 11/19/2017       No results found for: HGBA1C      ASSESSMENT:      ICD-10-CM    1. Dysuria R30.0      PLAN:    Dysuria  -UA obtained and sent to lab, results above.   -Awaiting culture.   -Provided gentle reassurance.   -Will follow with patient closely.  -Ordered pyridium for pain. Encouraged use of PRN medications.  -Increase water intake.     Total 25 minutes of which 75% was spent counseling and coordination of care of the above plan with patient, staff, and nursing staff.    Electronically signed by: Marie Oliveira CNP

## 2021-06-16 PROBLEM — E66.9 OBESITY: Status: ACTIVE | Noted: 2017-11-28

## 2021-06-16 PROBLEM — R30.0 DYSURIA: Status: ACTIVE | Noted: 2017-12-20

## 2021-06-16 PROBLEM — E11.9 DM TYPE 2 (DIABETES MELLITUS, TYPE 2) (H): Status: ACTIVE | Noted: 2017-11-28

## 2023-01-01 ENCOUNTER — TRANSITIONAL CARE UNIT VISIT (OUTPATIENT)
Dept: GERIATRICS | Facility: CLINIC | Age: 63
End: 2023-01-01
Payer: COMMERCIAL

## 2023-01-01 ENCOUNTER — LAB REQUISITION (OUTPATIENT)
Dept: LAB | Facility: CLINIC | Age: 63
End: 2023-01-01
Payer: MEDICARE

## 2023-01-01 ENCOUNTER — TELEPHONE (OUTPATIENT)
Dept: GERIATRICS | Facility: CLINIC | Age: 63
End: 2023-01-01
Payer: MEDICARE

## 2023-01-01 ENCOUNTER — LAB REQUISITION (OUTPATIENT)
Dept: LAB | Facility: CLINIC | Age: 63
End: 2023-01-01
Payer: COMMERCIAL

## 2023-01-01 VITALS
HEART RATE: 62 BPM | HEIGHT: 55 IN | OXYGEN SATURATION: 94 % | SYSTOLIC BLOOD PRESSURE: 143 MMHG | BODY MASS INDEX: 67.81 KG/M2 | WEIGHT: 293 LBS | DIASTOLIC BLOOD PRESSURE: 65 MMHG | TEMPERATURE: 97.3 F | RESPIRATION RATE: 18 BRPM

## 2023-01-01 VITALS
SYSTOLIC BLOOD PRESSURE: 136 MMHG | OXYGEN SATURATION: 94 % | HEIGHT: 55 IN | BODY MASS INDEX: 67.81 KG/M2 | WEIGHT: 293 LBS | RESPIRATION RATE: 19 BRPM | DIASTOLIC BLOOD PRESSURE: 68 MMHG | HEART RATE: 85 BPM | TEMPERATURE: 97.9 F

## 2023-01-01 DIAGNOSIS — I10 ESSENTIAL HYPERTENSION, BENIGN: ICD-10-CM

## 2023-01-01 DIAGNOSIS — I10 BENIGN ESSENTIAL HYPERTENSION: ICD-10-CM

## 2023-01-01 DIAGNOSIS — R52 PAIN: Primary | ICD-10-CM

## 2023-01-01 DIAGNOSIS — Z86.711 HISTORY OF PULMONARY EMBOLISM: ICD-10-CM

## 2023-01-01 DIAGNOSIS — I27.82 CHRONIC PULMONARY EMBOLISM (H): ICD-10-CM

## 2023-01-01 DIAGNOSIS — K04.7 TOOTH ABSCESS: Primary | ICD-10-CM

## 2023-01-01 DIAGNOSIS — E66.01 CLASS 3 SEVERE OBESITY WITHOUT SERIOUS COMORBIDITY WITH BODY MASS INDEX (BMI) GREATER THAN OR EQUAL TO 70 IN ADULT, UNSPECIFIED OBESITY TYPE (H): ICD-10-CM

## 2023-01-01 DIAGNOSIS — S22.43XS CLOSED FRACTURE OF MULTIPLE RIBS OF BOTH SIDES, SEQUELA: ICD-10-CM

## 2023-01-01 DIAGNOSIS — Z79.01 CURRENT USE OF LONG TERM ANTICOAGULATION: ICD-10-CM

## 2023-01-01 DIAGNOSIS — N17.9 AKI (ACUTE KIDNEY INJURY) (H): ICD-10-CM

## 2023-01-01 DIAGNOSIS — E66.813 CLASS 3 SEVERE OBESITY WITHOUT SERIOUS COMORBIDITY WITH BODY MASS INDEX (BMI) GREATER THAN OR EQUAL TO 70 IN ADULT, UNSPECIFIED OBESITY TYPE (H): ICD-10-CM

## 2023-01-01 DIAGNOSIS — R53.81 PHYSICAL DECONDITIONING: ICD-10-CM

## 2023-01-01 DIAGNOSIS — S22.43XS CLOSED FRACTURE OF MULTIPLE RIBS OF BOTH SIDES, SEQUELA: Primary | ICD-10-CM

## 2023-01-01 DIAGNOSIS — R60.0 BILATERAL LEG EDEMA: ICD-10-CM

## 2023-01-01 DIAGNOSIS — E11.649 TYPE 2 DIABETES MELLITUS WITH HYPOGLYCEMIA WITHOUT COMA, WITH LONG-TERM CURRENT USE OF INSULIN (H): ICD-10-CM

## 2023-01-01 DIAGNOSIS — E66.01 MORBID OBESITY (H): ICD-10-CM

## 2023-01-01 DIAGNOSIS — Z79.4 TYPE 2 DIABETES MELLITUS WITH HYPOGLYCEMIA WITHOUT COMA, WITH LONG-TERM CURRENT USE OF INSULIN (H): ICD-10-CM

## 2023-01-01 LAB — INR PPP: 3.16 (ref 0.85–1.15)

## 2023-01-01 PROCEDURE — 99310 SBSQ NF CARE HIGH MDM 45: CPT | Performed by: NURSE PRACTITIONER

## 2023-01-01 PROCEDURE — P9603 ONE-WAY ALLOW PRORATED MILES: HCPCS | Performed by: INTERNAL MEDICINE

## 2023-01-01 PROCEDURE — 36415 COLL VENOUS BLD VENIPUNCTURE: CPT | Performed by: INTERNAL MEDICINE

## 2023-01-01 PROCEDURE — 99309 SBSQ NF CARE MODERATE MDM 30: CPT | Performed by: NURSE PRACTITIONER

## 2023-01-01 PROCEDURE — 85610 PROTHROMBIN TIME: CPT | Performed by: INTERNAL MEDICINE

## 2023-01-01 RX ORDER — NYSTATIN 100000 [USP'U]/G
POWDER TOPICAL 3 TIMES DAILY PRN
COMMUNITY

## 2023-01-01 RX ORDER — MIRTAZAPINE 15 MG/1
15 TABLET, FILM COATED ORAL AT BEDTIME
COMMUNITY

## 2023-01-01 RX ORDER — ACETAMINOPHEN 500 MG
500-1000 TABLET ORAL EVERY 6 HOURS PRN
COMMUNITY

## 2023-01-01 RX ORDER — FUROSEMIDE 20 MG
20 TABLET ORAL DAILY
COMMUNITY
Start: 2023-01-01 | End: 2023-01-01

## 2023-01-01 RX ORDER — PREGABALIN 50 MG/1
50 CAPSULE ORAL DAILY
COMMUNITY
End: 2023-01-01

## 2023-01-01 RX ORDER — ONDANSETRON 4 MG/1
4 TABLET, ORALLY DISINTEGRATING ORAL EVERY 8 HOURS PRN
COMMUNITY
Start: 2022-01-01

## 2023-01-01 RX ORDER — HYDROMORPHONE HYDROCHLORIDE 2 MG/1
2 TABLET ORAL EVERY 4 HOURS PRN
COMMUNITY
Start: 2023-01-01 | End: 2023-01-01

## 2023-01-01 RX ORDER — HYDROXYZINE HYDROCHLORIDE 25 MG/1
25 TABLET, FILM COATED ORAL 3 TIMES DAILY PRN
COMMUNITY

## 2023-01-01 RX ORDER — ALBUTEROL SULFATE 90 UG/1
AEROSOL, METERED RESPIRATORY (INHALATION)
COMMUNITY
Start: 2021-08-24

## 2023-01-01 RX ORDER — METOPROLOL TARTRATE 25 MG/1
25 TABLET, FILM COATED ORAL 2 TIMES DAILY
COMMUNITY
Start: 2023-01-01 | End: 2023-01-01

## 2023-01-01 RX ORDER — HYDRALAZINE HYDROCHLORIDE 50 MG/1
50 TABLET, FILM COATED ORAL 2 TIMES DAILY
COMMUNITY

## 2023-01-01 RX ORDER — LIDOCAINE 4 G/G
2 PATCH TOPICAL
COMMUNITY
Start: 2023-01-01

## 2023-01-01 RX ORDER — TIZANIDINE 2 MG/1
TABLET ORAL
COMMUNITY
Start: 2023-01-01

## 2023-01-01 RX ORDER — HYDROMORPHONE HYDROCHLORIDE 2 MG/1
2 TABLET ORAL EVERY 4 HOURS PRN
Qty: 120 TABLET | Refills: 0 | Status: SHIPPED | OUTPATIENT
Start: 2023-01-01

## 2023-01-01 RX ORDER — OXYBUTYNIN CHLORIDE 15 MG/1
15 TABLET, EXTENDED RELEASE ORAL DAILY
COMMUNITY

## 2023-01-01 RX ORDER — AMLODIPINE BESYLATE 10 MG/1
10 TABLET ORAL DAILY
COMMUNITY

## 2023-01-01 RX ORDER — SENNOSIDES A AND B 8.6 MG/1
17.2 TABLET, FILM COATED ORAL 2 TIMES DAILY PRN
COMMUNITY
Start: 2023-01-01

## 2023-01-01 RX ORDER — PREGABALIN 50 MG/1
CAPSULE ORAL
Qty: 60 CAPSULE | Refills: 0 | Status: SHIPPED | OUTPATIENT
Start: 2023-01-01

## 2023-01-01 RX ORDER — ESTRADIOL 0.1 MG/G
CREAM VAGINAL
COMMUNITY

## 2023-01-01 ASSESSMENT — PAIN SCALES - GENERAL: PAINLEVEL: MILD PAIN (3)

## 2023-03-22 NOTE — PROGRESS NOTES
[facility] GERIATR[facility]    PRIMARY CARE PROVIDER AND CLINIC:  No primary care provider on file. Only notes available in Epic from any outpatient provider is Kely Willson NP  Chief Complaint   Patient presents with     Hospital F/U      [place] Medical Record Number:  [id number]  Place of Service where encounter took place:  [facility] ([facility]) [[id number]]    Lyndsay Rene  is a 63 year old  (1960), admitted to the above facility from  [facility]. Hospital stay 3/16/23 through 3/21/23. Patient with PMH morbid obesity, DM2, CAD, recurrent PE on chronic warfarin, HTN, ELVIRA on CPAP, and recent hospitalization 3/3 to 3/15 after fall with bilateral rib fractures 5 through 8. Lisinopril and hydrochlorothiazide discontinued due to JR. She discharged home after that hospital stay, but returned less than 8 hours later with uncontrolled pain and inability to care for self at home.    HPI obtained from patient visit, review of nursing home record, discussion with facility staff, and Epic review.      HPI:    Patient is currently sleeping soundly. She opens eyes slightly a few times, mumbles some responses. In speaking with her nurse, he reports that she did not sleep well last night. She was awake this morning, ate breakfast, had pain medication about 2 hours ago, and just returned from her PT session    CODE STATUS/ADVANCE DIRECTIVES DISCUSSION:  Full Code   ALLERGIES:   Allergies   Allergen Reactions     Cyclobenzaprine Other (See Comments)     Other reaction(s): Behavioral Disturbances  Serotonin type reaction.      Liraglutide Nausea     Metformin Diarrhea     Nitrofurazone Other (See Comments) and Hives     Serotonin       PAST MEDICAL HISTORY: No past medical history on file.   PAST SURGICAL HISTORY:   has a past surgical history that includes hernia repair (06/20/2017); appendectomy; Hysterectomy; and other surgical history.  FAMILY HISTORY: family history includes Diabetes in her father and  mother.  SOCIAL HISTORY:   reports that she has never smoked. She has never used smokeless tobacco. She reports that she does not drink alcohol and does not use drugs.  Patient's living condition: lives with spouse    Post Discharge Medication Reconciliation Status:   MED REC REQUIRED  Post Medication Reconciliation Status:  Discharge medications reconciled, continue medications without change         Current Outpatient Medications   Medication Sig     acetaminophen (TYLENOL) 500 MG tablet Take 500-1,000 mg by mouth every 6 hours as needed     albuterol (PROAIR HFA/PROVENTIL HFA/VENTOLIN HFA) 108 (90 Base) MCG/ACT inhaler inhale 2 puff by inhalation route  every 4 - 6 hours as needed     amLODIPine (NORVASC) 10 MG tablet Take 10 mg by mouth daily     atorvastatin (LIPITOR) 40 MG tablet [ATORVASTATIN (LIPITOR) 40 MG TABLET] Take 40 mg by mouth every evening.     diclofenac (VOLTAREN) 1 % topical gel 4 times daily as needed     DULoxetine (CYMBALTA) 30 MG capsule Take 90 mg by mouth daily     estradiol (ESTRACE) 0.1 MG/GM vaginal cream Estrace 0.01% (0.1 mg/gram) vaginal cream  AS DIRECTED ONCE A DAY VAGINAL     furosemide (LASIX) 20 MG tablet Take 20 mg by mouth daily     hydrALAZINE (APRESOLINE) 50 MG tablet Take 50 mg by mouth 2 times daily     HYDROmorphone (DILAUDID) 2 MG tablet Take 2 mg by mouth every 4 hours as needed     hydrOXYzine (ATARAX) 25 MG tablet Take 25 mg by mouth 3 times daily as needed     insulin aspart (NOVOLOG PEN) 100 UNIT/ML pen Inject as per sliding scale: if 150 - 199 = 1 UNIT; 200 - 249 = 2 UNITS; 250 - 299 = 3 UNITS; 300 - 349 = 4 UNITS; 350 - 399 = 5 UNTS; 400 - 999 = 6 UNITS CALL MD , subcutaneously at bedtime     insulin glargine (LANTUS PEN) 100 UNIT/ML pen Inject 25 Units Subcutaneous 2 times daily     Lidocaine (LIDOCARE) 4 % Patch Place 2 patches onto the skin     melatonin 5 MG tablet Take 10 mg by mouth nightly as needed     metoprolol tartrate (LOPRESSOR) 25 MG tablet Take 25 mg  "by mouth 2 times daily     mirtazapine (REMERON) 15 MG tablet Take 15 mg by mouth At Bedtime     nystatin (MYCOSTATIN) 921945 UNIT/GM external powder Apply topically 3 times daily as needed     ondansetron (ZOFRAN ODT) 4 MG ODT tab Place 4 mg under the tongue every 8 hours as needed     oxybutynin ER (DITROPAN XL) 15 MG 24 hr tablet Take 15 mg by mouth daily     polyethylene glycol (MIRALAX) 17 gram packet Take 17 g by mouth daily as needed     senna (SENOKOT) 8.6 MG tablet Take 17.2 mg by mouth 2 times daily as needed     tiZANidine (ZANAFLEX) 2 MG tablet TAKE 1-2 TABLETS (2-4 MG) BY MOUTH EVERY 8 HOURS IF NEEDED FOR MUSCLE SPASM (MAXIMUM 4 TAB PER DAY).     warfarin (COUMADIN) 5 MG tablet [WARFARIN (COUMADIN) 5 MG TABLET] Take 5 mg by mouth See Admin Instructions. Adjust dose based on INR results as directed. Next INR 1 day after discharge     pregabalin (LYRICA) 50 MG capsule TAKE 1 CAP BY MOUTH EVERY MORNING AND 2 CAPS (100MG) BY MOUTH AT BEDTIME     No current facility-administered medications for this visit.       ROS:  Unobtainable secondary to drowsiness.     Vitals:  /68   Pulse 85   Temp 97.9  F (36.6  C)   Resp 19   Ht 1.397 m (4' 7\")   Wt (!) 173.7 kg (383 lb)   SpO2 94%   BMI 89.02 kg/m    Exam:  GENERAL APPEARANCE:  morbidly obese, Sleeping soundly, no apparent distress  RESP:  no respiratory distress    Lab/Diagnostic data:  Recent labs in [facility] reviewed by me today.     ASSESSMENT/PLAN:  (S22.43XS) Closed fracture of multiple ribs of both sides, sequela  (primary encounter diagnosis)  Comment: Unable to assess pain today. Based on nursing report, it is reasonable that she is sleeping so soundly at this time. Will reassess later this week  Plan: Continue current POC with no changes at this time and adjustments as needed.    (R53.81) Physical deconditioning  Comment: Acute worsening with rib injuries, but expect that patient's baseline mobility is limited due to obesity and chronic pain. "   Plan: PT/OT eval and treat, discharge planning per their recommendations.    (E11.649,  Z79.4) Type 2 diabetes mellitus with hypoglycemia without coma, with long-term current use of insulin (H)  Comment: No data to assess at this time  Plan: BGM QID. Adjust medication as clinically indicated.    (I10) Essential hypertension, benign  Comment: Currently off lisinopril and hydrochlorothiazide. Will restart if needed and if kidney function allows  Plan: Continue current POC with no changes at this time and adjustments as needed.    (R60.0) Bilateral leg edema  Comment: ?acute due to IVFs  Plan: Continue furosemide. Monitor edema and BMP    (E66.01) Morbid obesity (H)  Comment: BMI>40 is consistent with morbid obesity. This is clinically significant due to increased nursing cares, use of resources and specialty equipment.     (Z86.711) History of pulmonary embolism  (Z79.01) Current use of long term anticoagulation  Comment: INR supratherapeutic. Home warfarin dose 5mg, but in the hospital it was varying from 2-5mg.   Plan: warfarin 2mg daily. INR 3/27      Electronically signed by:  LARISSA Joyce CNP

## 2023-03-22 NOTE — LETTER
3/22/2023        RE: Lyndsay Rene  [address]  [facility] [place] [address]        [facility] GERIATRICS    PRIMARY CARE PROVIDER AND CLINIC:  No primary care provider on file. Only notes available in [facility] from any outpatient provider is Kely Willson NP  Chief Complaint   Patient presents with     Hospital F/U      [facility] Record Number:  [id number]  Place of Service where encounter took place:  [facility] ([facility]) [[id number]]    Lyndsay Rene  is a 63 year old  (1960), admitted to the above facility from  [facility]. Hospital stay 3/16/23 through 3/21/23. Patient with PMH morbid obesity, DM2, CAD, recurrent PE on chronic warfarin, HTN, ELVIRA on CPAP, and recent hospitalization 3/3 to 3/15 after fall with bilateral rib fractures 5 through 8. Lisinopril and hydrochlorothiazide discontinued due to JR. She discharged home after that hospital stay, but returned less than 8 hours later with uncontrolled pain and inability to care for self at home.    HPI obtained from patient visit, review of nursing home record, discussion with facility staff, and Epic review.      HPI:    Patient is currently sleeping soundly. She opens eyes slightly a few times, mumbles some responses. In speaking with her nurse, he reports that she did not sleep well last night. She was awake this morning, ate breakfast, had pain medication about 2 hours ago, and just returned from her PT session    CODE STATUS/ADVANCE DIRECTIVES DISCUSSION:  Full Code   ALLERGIES:   Allergies   Allergen Reactions     Cyclobenzaprine Other (See Comments)     Other reaction(s): Behavioral Disturbances  Serotonin type reaction.      Liraglutide Nausea     Metformin Diarrhea     Nitrofurazone Other (See Comments) and Hives     Serotonin       PAST MEDICAL HISTORY: No past medical history on file.   PAST SURGICAL HISTORY:   has a past surgical history that includes hernia repair (06/20/2017); appendectomy;  Hysterectomy; and other surgical history.  FAMILY HISTORY: family history includes Diabetes in her father and mother.  SOCIAL HISTORY:   reports that she has never smoked. She has never used smokeless tobacco. She reports that she does not drink alcohol and does not use drugs.  Patient's living condition: lives with spouse    Post Discharge Medication Reconciliation Status:   MED REC REQUIRED  Post Medication Reconciliation Status:  Discharge medications reconciled, continue medications without change         Current Outpatient Medications   Medication Sig     acetaminophen (TYLENOL) 500 MG tablet Take 500-1,000 mg by mouth every 6 hours as needed     albuterol (PROAIR HFA/PROVENTIL HFA/VENTOLIN HFA) 108 (90 Base) MCG/ACT inhaler inhale 2 puff by inhalation route  every 4 - 6 hours as needed     amLODIPine (NORVASC) 10 MG tablet Take 10 mg by mouth daily     atorvastatin (LIPITOR) 40 MG tablet [ATORVASTATIN (LIPITOR) 40 MG TABLET] Take 40 mg by mouth every evening.     diclofenac (VOLTAREN) 1 % topical gel 4 times daily as needed     DULoxetine (CYMBALTA) 30 MG capsule Take 90 mg by mouth daily     estradiol (ESTRACE) 0.1 MG/GM vaginal cream Estrace 0.01% (0.1 mg/gram) vaginal cream  AS DIRECTED ONCE A DAY VAGINAL     furosemide (LASIX) 20 MG tablet Take 20 mg by mouth daily     hydrALAZINE (APRESOLINE) 50 MG tablet Take 50 mg by mouth 2 times daily     HYDROmorphone (DILAUDID) 2 MG tablet Take 2 mg by mouth every 4 hours as needed     hydrOXYzine (ATARAX) 25 MG tablet Take 25 mg by mouth 3 times daily as needed     insulin aspart (NOVOLOG PEN) 100 UNIT/ML pen Inject as per sliding scale: if 150 - 199 = 1 UNIT; 200 - 249 = 2 UNITS; 250 - 299 = 3 UNITS; 300 - 349 = 4 UNITS; 350 - 399 = 5 UNTS; 400 - 999 = 6 UNITS CALL MD , subcutaneously at bedtime     insulin glargine (LANTUS PEN) 100 UNIT/ML pen Inject 25 Units Subcutaneous 2 times daily     Lidocaine (LIDOCARE) 4 % Patch Place 2 patches onto the skin     melatonin  "5 MG tablet Take 10 mg by mouth nightly as needed     metoprolol tartrate (LOPRESSOR) 25 MG tablet Take 25 mg by mouth 2 times daily     mirtazapine (REMERON) 15 MG tablet Take 15 mg by mouth At Bedtime     nystatin (MYCOSTATIN) 745612 UNIT/GM external powder Apply topically 3 times daily as needed     ondansetron (ZOFRAN ODT) 4 MG ODT tab Place 4 mg under the tongue every 8 hours as needed     oxybutynin ER (DITROPAN XL) 15 MG 24 hr tablet Take 15 mg by mouth daily     polyethylene glycol (MIRALAX) 17 gram packet Take 17 g by mouth daily as needed     senna (SENOKOT) 8.6 MG tablet Take 17.2 mg by mouth 2 times daily as needed     tiZANidine (ZANAFLEX) 2 MG tablet TAKE 1-2 TABLETS (2-4 MG) BY MOUTH EVERY 8 HOURS IF NEEDED FOR MUSCLE SPASM (MAXIMUM 4 TAB PER DAY).     warfarin (COUMADIN) 5 MG tablet [WARFARIN (COUMADIN) 5 MG TABLET] Take 5 mg by mouth See Admin Instructions. Adjust dose based on INR results as directed. Next INR 1 day after discharge     pregabalin (LYRICA) 50 MG capsule TAKE 1 CAP BY MOUTH EVERY MORNING AND 2 CAPS (100MG) BY MOUTH AT BEDTIME     No current facility-administered medications for this visit.       ROS:  Unobtainable secondary to drowsiness.     Vitals:  /68   Pulse 85   Temp 97.9  F (36.6  C)   Resp 19   Ht 1.397 m (4' 7\")   Wt (!) 173.7 kg (383 lb)   SpO2 94%   BMI 89.02 kg/m    Exam:  GENERAL APPEARANCE:  morbidly obese, Sleeping soundly, no apparent distress  RESP:  no respiratory distress    Lab/Diagnostic data:  Recent labs in EPIC reviewed by me today.     ASSESSMENT/PLAN:  (S22.43XS) Closed fracture of multiple ribs of both sides, sequela  (primary encounter diagnosis)  Comment: Unable to assess pain today. Based on nursing report, it is reasonable that she is sleeping so soundly at this time. Will reassess later this week  Plan: Continue current POC with no changes at this time and adjustments as needed.    (R53.81) Physical deconditioning  Comment: Acute worsening " with rib injuries, but expect that patient's baseline mobility is limited due to obesity and chronic pain.   Plan: PT/OT eval and treat, discharge planning per their recommendations.    (E11.649,  Z79.4) Type 2 diabetes mellitus with hypoglycemia without coma, with long-term current use of insulin (H)  Comment: No data to assess at this time  Plan: BGM QID. Adjust medication as clinically indicated.    (I10) Essential hypertension, benign  Comment: Currently off lisinopril and hydrochlorothiazide. Will restart if needed and if kidney function allows  Plan: Continue current POC with no changes at this time and adjustments as needed.    (R60.0) Bilateral leg edema  Comment: ?acute due to IVFs  Plan: Continue furosemide. Monitor edema and BMP    (E66.01) Morbid obesity (H)  Comment: BMI>40 is consistent with morbid obesity. This is clinically significant due to increased nursing cares, use of resources and specialty equipment.     (Z86.711) History of pulmonary embolism  (Z79.01) Current use of long term anticoagulation  Comment: INR supratherapeutic. Home warfarin dose 5mg, but in the hospital it was varying from 2-5mg.   Plan: warfarin 2mg daily. INR 3/27      Electronically signed by:  LARISSA Joyce CNP                       Sincerely,        LARISSA Joyce CNP

## 2023-03-24 NOTE — LETTER
"    3/24/2023        RE: Lyndsay Rene  [address]  [facility] [place] [address]        [facility]    Chief Complaint   Patient presents with     RECHECK     HPI:  Lyndsay Rene is a 63 year old  (1960), who is being seen today for an episodic care visit at: [facility] ([facility]) [[id number]]. Admitted to the above facility from  [facility]. Hospital stay 3/16/23 through 3/21/23. Patient with PMH morbid obesity, DM2, CAD, recurrent PE on chronic warfarin, HTN, ELVIRA on CPAP, and recent hospitalization 3/3 to 3/15 after fall with bilateral rib fractures 5 through 8. Lisinopril and hydrochlorothiazide discontinued due to JR. She discharged home after that hospital stay, but returned less than 8 hours later with uncontrolled pain and inability to care for self at home.    Today's concern is:   Patient reports right sided jaw pain that has progressively worsened the past three days. Right sided jaw pain described as sore and severely tender to palpation. Mild swelling right cheek noted. Patient reports she cracked her tooth about a week ago. Reports three day history of chills. Also endorses sore throat and hoarse voice that started today. Denies shortness of breath, chest pain, dizziness. Unable to focus on much except for jaw pain.     Allergies, and PMH/PSH reviewed in EPIC today.  REVIEW OF SYSTEMS:  10 point ROS of systems including Constitutional, Eyes, Respiratory, Cardiovascular, Gastroenterology, Genitourinary, Integumentary, Musculoskeletal, Psychiatric were all negative except for pertinent positives noted in my HPI.    Objective:   BP (!) 143/65   Pulse 62   Temp 97.3  F (36.3  C)   Resp 18   Ht 1.397 m (4' 7\")   Wt (!) 173.7 kg (383 lb)   SpO2 94%   BMI 89.02 kg/m    GENERAL APPEARANCE:  Alert, oriented, morbidly obese  ENT:  Mouth and posterior oropharynx normal, moist mucous membranes, normal hearing acuity, poor dentition, multiple missing teeth, " right upper posterior tooth appears cracked with red edematous gum tissue surrounding tooth  RESP:  respiratory effort and palpation of chest normal, diminished breath sounds bilaterally  CV:  Palpation and auscultation of heart done , regular rate and rhythm, no murmur, rub, or gallop, peripheral edema 1+ in bilateral lower extremities   ABDOMEN:  normal bowel sounds, soft, nontender, no hepatosplenomegaly or other masses, obese  PSYCH:  affect and mood normal    Recent labs in [facility] reviewed by me today.     Assessment/Plan:  (K04.7) Tooth abscess  (primary encounter diagnosis)  Comment: Right sided jaw swelling, pain and tenderness with palpation x3 days. Reports chills and hoarse voice/sore throat. Notes she cracked her tooth on right side about a week ago and pain has progressively gotten worse. Patient with very poor dentition. Exam of right upper posterior tooth appears cracked and red edematous gum tissue surrounding tooth appears infected. Vital signs are within normal limits with no documented fevers.   Plan: Augmentin 875 mg PO BID x10 days. Patient needs to follow up with dentist as soon as possible to assess need for tooth extraction. Tooth pain should be managed with available PRN medications, would avoid adding additional pain medications at this time due to ELVIRA/likely obesity hypoventilation syndrome. Heat/ice compression for comfort.     (S22.43XS) Closed fracture of multiple ribs of both sides, sequela  Comment: Patient unable to describe rib pain today as her right jaw pain is more significant and distracting to patient.   Plan: Continue current POC with no changes at this time and adjustments as needed.     (R53.81) Physical deconditioning  Comment: Acute worsening with rib injuries. Patient's baseline mobility already limited due to obesity and chronic pain.   Plan: PT/OT eval and treat, discharge planning per their recommendations.    (E66.01,  Z68.45) Class 3 severe obesity without serious  comorbidity with body mass index (BMI) greater than or equal to 70 in adult, unspecified obesity type (H)  Comment: BMI>40 is consistent with morbid obesity. This is clinically significant due to increased nursing cares, use of resources and specialty equipment    (I10) Benign essential hypertension  Comment: PTA on lisinopril and hydrochlorothiazide on hold since hospitalization due to JR. SBP 140s.   Plan: Restart home BP medications as clinically indicated and per renal function. [facility] 3/27.       Orders:  -Augmentin 875 mg PO BID x10 days  -Patient needs to see dentist as soon as possible regarding tooth abscess and potential need for tooth extraction  -[facility] 3/27       Electronically signed by: Hannah Hollenhorst, NP Student    I was present with the student who participated in the service and in the documentation of the note. I have verified the history and personally performed the physical exam and medical decision-making. I agree with the assessment and plan of care as documented in the note.  Electronically signed by:   LARISSA Joyce CNP on 3/24/2023 at 4:45 PM            Sincerely,        LARISSA Joyce CNP

## 2023-03-24 NOTE — PROGRESS NOTES
"[facility] GERIATRICS    Chief Complaint   Patient presents with     RECHECK     HPI:  Lyndsay Rene is a 63 year old  (1960), who is being seen today for an episodic care visit at: [facility] ([facility]) [[id number]]. Admitted to the above facility from  [facility]. Hospital stay 3/16/23 through 3/21/23. Patient with PMH morbid obesity, DM2, CAD, recurrent PE on chronic warfarin, HTN, ELVIRA on CPAP, and recent hospitalization 3/3 to 3/15 after fall with bilateral rib fractures 5 through 8. Lisinopril and hydrochlorothiazide discontinued due to JR. She discharged home after that hospital stay, but returned less than 8 hours later with uncontrolled pain and inability to care for self at home.    Today's concern is:   Patient reports right sided jaw pain that has progressively worsened the past three days. Right sided jaw pain described as sore and severely tender to palpation. Mild swelling right cheek noted. Patient reports she cracked her tooth about a week ago. Reports three day history of chills. Also endorses sore throat and hoarse voice that started today. Denies shortness of breath, chest pain, dizziness. Unable to focus on much except for jaw pain.     Allergies, and PMH/PSH reviewed in EPIC today.  REVIEW OF SYSTEMS:  10 point ROS of systems including Constitutional, Eyes, Respiratory, Cardiovascular, Gastroenterology, Genitourinary, Integumentary, Musculoskeletal, Psychiatric were all negative except for pertinent positives noted in my HPI.    Objective:   BP (!) 143/65   Pulse 62   Temp 97.3  F (36.3  C)   Resp 18   Ht 1.397 m (4' 7\")   Wt (!) 173.7 kg (383 lb)   SpO2 94%   BMI 89.02 kg/m    GENERAL APPEARANCE:  Alert, oriented, morbidly obese  ENT:  Mouth and posterior oropharynx normal, moist mucous membranes, normal hearing acuity, poor dentition, multiple missing teeth, right upper posterior tooth appears cracked with red edematous gum tissue surrounding tooth  RESP:  " respiratory effort and palpation of chest normal, diminished breath sounds bilaterally  CV:  Palpation and auscultation of heart done , regular rate and rhythm, no murmur, rub, or gallop, peripheral edema 1+ in bilateral lower extremities   ABDOMEN:  normal bowel sounds, soft, nontender, no hepatosplenomegaly or other masses, obese  PSYCH:  affect and mood normal    Recent labs in [facility] reviewed by me today.     Assessment/Plan:  (K04.7) Tooth abscess  (primary encounter diagnosis)  Comment: Right sided jaw swelling, pain and tenderness with palpation x3 days. Reports chills and hoarse voice/sore throat. Notes she cracked her tooth on right side about a week ago and pain has progressively gotten worse. Patient with very poor dentition. Exam of right upper posterior tooth appears cracked and red edematous gum tissue surrounding tooth appears infected. Vital signs are within normal limits with no documented fevers.   Plan: Augmentin 875 mg PO BID x10 days. Patient needs to follow up with dentist as soon as possible to assess need for tooth extraction. Tooth pain should be managed with available PRN medications, would avoid adding additional pain medications at this time due to ELVIRA/likely obesity hypoventilation syndrome. Heat/ice compression for comfort.     (S22.43XS) Closed fracture of multiple ribs of both sides, sequela  Comment: Patient unable to describe rib pain today as her right jaw pain is more significant and distracting to patient.   Plan: Continue current POC with no changes at this time and adjustments as needed.     (R53.81) Physical deconditioning  Comment: Acute worsening with rib injuries. Patient's baseline mobility already limited due to obesity and chronic pain.   Plan: PT/OT eval and treat, discharge planning per their recommendations.    (E66.01,  Z68.45) Class 3 severe obesity without serious comorbidity with body mass index (BMI) greater than or equal to 70 in adult, unspecified obesity type  (H)  Comment: BMI>40 is consistent with morbid obesity. This is clinically significant due to increased nursing cares, use of resources and specialty equipment    (I10) Benign essential hypertension  Comment: PTA on lisinopril and hydrochlorothiazide on hold since hospitalization due to JR. SBP 140s.   Plan: Restart home BP medications as clinically indicated and per renal function. [facility] 3/27.       Orders:  -Augmentin 875 mg PO BID x10 days  -Patient needs to see dentist as soon as possible regarding tooth abscess and potential need for tooth extraction  -[facility] 3/27       Electronically signed by: Hannah Hollenhorst, NP Student    I was present with the student who participated in the service and in the documentation of the note. I have verified the history and personally performed the physical exam and medical decision-making. I agree with the assessment and plan of care as documented in the note.  Electronically signed by:   LARISSA Joyce CNP on 3/24/2023 at 4:45 PM

## 2023-03-25 NOTE — CONFIDENTIAL NOTE
Lyndsay Rene is a 63 year old  (1960), Nurse called today to report: patient requesting hospital evaluation per tooth issue and possible cellulitis     Sent to ED:  sent to hospital     Electronically signed by:   Jonnie Nieto NP